# Patient Record
Sex: MALE | Race: ASIAN | NOT HISPANIC OR LATINO | Employment: FULL TIME | URBAN - METROPOLITAN AREA
[De-identification: names, ages, dates, MRNs, and addresses within clinical notes are randomized per-mention and may not be internally consistent; named-entity substitution may affect disease eponyms.]

---

## 2021-06-10 ENCOUNTER — TELEPHONE (OUTPATIENT)
Dept: INTERNAL MEDICINE | Facility: CLINIC | Age: 41
End: 2021-06-10

## 2021-06-10 NOTE — TELEPHONE ENCOUNTER
Caller: Chucho Jorgensen    Relationship: Self    Best call back number:[phone]     Who are you requesting to speak with (clinical staff, provider,  specific staff member): NURSE    What was the call regarding: PATIENT STATES THAT HE IS A CURRENT PATIENT OF DR. WESTFALL AND HIS MOTHER IN LAW RECENTLY TESTED POSITIVE FOR HEP A. PATIENT IS CONCERNED AND WANTS TO KNOW THE STEPS THAT HE NEEDS TO TAKE. SHE WAS LIVING WITH THEM RECENTLY AND HE FEELS THE FAMILY IS AT RISK FOR EXPOSURE.    Do you require a callback: YES

## 2021-06-11 RX ORDER — IMMUNE GLOBULIN (HUMAN) 0.17 G/ML
INJECTION, SOLUTION INTRAMUSCULAR ONCE
Status: CANCELLED | OUTPATIENT
Start: 2021-06-11 | End: 2021-06-11

## 2021-06-11 NOTE — TELEPHONE ENCOUNTER
If he has had the hepatitis A shot he should not have to do anything other than your typical hygiene.  If is not had a hepatitis A shot I would probably have him come in.  Also if somebody has a close exposure sometimes we try to give them immunoglobulin but that has been difficult to get as of late.  Probably would set up an appointment

## 2021-06-11 NOTE — TELEPHONE ENCOUNTER
I S/W THE PATIENT AND RELAYED DR. WESTFALL'S MESSAGE.  THE PATIENT IS GOING TO CALL BACK AND SCHEDULE AN APPT TO GET THE HEP A VACCINE.    THANK YOU

## 2021-06-11 NOTE — TELEPHONE ENCOUNTER
I S/W THE PATIENT AND HE SAID THAT SHE ACTUALLY TESTED POSITIVE FOR HEP A OVER A MONTH AGO AND JUST FOUND OUT THE RESULTS YESTERDAY.  SHE LIVED WITH KUMAR AND HIS FAMILY FOR ABOUT A MONTH AND A HALF WHILE SHE WAS HEALING FROM A BROKEN BONE (MID-April THROUGH MAY).  SHE NEVER HAD ANY SYMPTOMS AND NO ONE IN HIS FAMILY HAS ANY SYMPTOMS.  HE WAS WONDERING IF THAT CHANGES ANYTHING AND IF HE STILL NEEDS TO COME IN?    THANK YOU

## 2022-12-20 ENCOUNTER — OFFICE VISIT (OUTPATIENT)
Dept: INTERNAL MEDICINE | Facility: CLINIC | Age: 42
End: 2022-12-20

## 2022-12-20 VITALS
TEMPERATURE: 97.3 F | HEIGHT: 62 IN | BODY MASS INDEX: 32.2 KG/M2 | RESPIRATION RATE: 16 BRPM | SYSTOLIC BLOOD PRESSURE: 134 MMHG | HEART RATE: 95 BPM | DIASTOLIC BLOOD PRESSURE: 92 MMHG | WEIGHT: 175 LBS | OXYGEN SATURATION: 100 %

## 2022-12-20 DIAGNOSIS — Z00.00 ROUTINE GENERAL MEDICAL EXAMINATION AT A HEALTH CARE FACILITY: ICD-10-CM

## 2022-12-20 DIAGNOSIS — Z12.5 SPECIAL SCREENING FOR MALIGNANT NEOPLASM OF PROSTATE: ICD-10-CM

## 2022-12-20 DIAGNOSIS — R42 VERTIGO: ICD-10-CM

## 2022-12-20 DIAGNOSIS — R05.8 OTHER COUGH: Primary | ICD-10-CM

## 2022-12-20 PROCEDURE — 99396 PREV VISIT EST AGE 40-64: CPT | Performed by: INTERNAL MEDICINE

## 2022-12-20 NOTE — PROGRESS NOTES
"Chief Complaint   Patient presents with   • Sore Throat   • Cough   • Headache     No fever        Subjective   Chucho Jorgensen is a 42 y.o. male here for   Chief Complaint   Patient presents with   • Sore Throat   • Cough   • Headache     No fever    .    History of Present Illness     The following portions of the patient's history were reviewed and updated as appropriate: allergies, current medications, past family history, past medical history, past social history, past surgical history, and problem list.  Here for the physical today.  Overall doing very well.  No specific concerns.  We talked about diet and exercise habits.  Discussed prevention recommendations.   Review of Systems   Constitutional: Positive for fever.   HENT: Positive for congestion and sore throat. Negative for drooling, facial swelling, hearing loss, mouth sores, rhinorrhea, sinus pressure, swollen glands, tinnitus and trouble swallowing.    Eyes: Negative.    Respiratory: Negative.    Cardiovascular: Negative.    Gastrointestinal: Negative.    Endocrine: Negative.    Genitourinary: Negative for difficulty urinating, dysuria, flank pain, frequency, hematuria and urgency.   Musculoskeletal: Negative for arthralgias and myalgias.   Skin: Negative for color change, dry skin, rash and wound.   Neurological: Negative for dizziness, tremors, weakness, headache, memory problem and confusion.        Vertigo for about 4 weeks getting better   Hematological: Negative for adenopathy. Does not bruise/bleed easily.   Psychiatric/Behavioral: Negative for agitation, behavioral problems, depressed mood and stress. The patient is not nervous/anxious.        Body mass index is 32.01 kg/m².   Vitals:    12/20/22 1026   BP: 134/92   BP Location: Left arm   Patient Position: Sitting   Cuff Size: Large Adult   Pulse: 95   Resp: 16   Temp: 97.3 °F (36.3 °C)   TempSrc: Temporal   SpO2: 100%   Weight: 79.4 kg (175 lb)   Height: 157.5 cm (62\")        Objective "   Physical Exam  Vitals and nursing note reviewed.   Constitutional:       Appearance: Normal appearance.   HENT:      Head: Normocephalic and atraumatic.   Eyes:      Extraocular Movements: Extraocular movements intact.      Pupils: Pupils are equal, round, and reactive to light.   Cardiovascular:      Rate and Rhythm: Normal rate and regular rhythm.   Pulmonary:      Effort: Pulmonary effort is normal.      Breath sounds: Normal breath sounds.   Abdominal:      General: Abdomen is flat.      Palpations: Abdomen is soft.   Musculoskeletal:         General: No swelling or deformity.      Cervical back: Neck supple.      Right lower leg: No edema.      Left lower leg: No edema.   Skin:     General: Skin is warm.      Capillary Refill: Capillary refill takes less than 2 seconds.      Findings: No rash.   Neurological:      General: No focal deficit present.      Mental Status: He is alert and oriented to person, place, and time.      Cranial Nerves: Cranial nerves 2-12 are intact.      Sensory: Sensation is intact.      Motor: Motor function is intact.      Coordination: Coordination is intact.      Gait: Gait is intact.         No results found for: CBCDIF, CMP, LIPIDINTERP, HGBA1C, TSH, LLGC87FV, PSA, TESTOSTERONE     Health Maintenance   Topic Date Due   • TDAP/TD VACCINES (1 - Tdap) Never done   • INFLUENZA VACCINE  08/01/2022        Assessment & Plan   Problems Addressed this Visit    None  Visit Diagnoses     Vertigo    -  Primary    Relevant Orders    Ear Cerumen Removal    Routine general medical examination at a health care facility        Relevant Orders    CBC & Differential    Comprehensive Metabolic Panel    Hemoglobin A1c    TSH    Testosterone    Lipid Panel With / Chol / HDL Ratio    PSA Screen    Special screening for malignant neoplasm of prostate        Relevant Orders    CBC & Differential    Comprehensive Metabolic Panel    Hemoglobin A1c    TSH    Testosterone    Lipid Panel With / Chol / HDL  Ratio    PSA Screen      Diagnoses       Codes Comments    Vertigo    -  Primary ICD-10-CM: R42  ICD-9-CM: 780.4     Routine general medical examination at a health care facility     ICD-10-CM: Z00.00  ICD-9-CM: V70.0     Special screening for malignant neoplasm of prostate     ICD-10-CM: Z12.5  ICD-9-CM: V76.44           Orders Placed This Encounter   Procedures   • Ear Cerumen Removal      Elevated blood pressure mild.  I think working on weight reduction and exercise to start with and follow-up in 2 months if it still elevated we can add an ARB  Vertigo with no neurologic abnormalities.  No hearing loss or tinnitus probably viral labyrinthitis.  It is improving but still some subtle symptoms.  Epley maneuver discussed and if it continues we will do an MRI but he wanted to wait on that.  I think this will continue to resolve  Upper respiratory illness with negative flu testing.  COVID test pending  .  Quarantine until COVID test negative    Depression screen reviewed with patient and negative.  Advised behavioral modifications including dietary changes and increased exercise with goal of healthy weight and lifestyle.   Will make follow up plans based on lab results as above.          Return in about 2 months (around 2/20/2023).

## 2022-12-21 ENCOUNTER — TELEPHONE (OUTPATIENT)
Dept: INTERNAL MEDICINE | Facility: CLINIC | Age: 42
End: 2022-12-21

## 2022-12-21 LAB
ALBUMIN SERPL-MCNC: 5 G/DL (ref 3.5–5.2)
ALBUMIN/GLOB SERPL: 2.5 G/DL
ALP SERPL-CCNC: 75 U/L (ref 39–117)
ALT SERPL-CCNC: 32 U/L (ref 1–41)
AST SERPL-CCNC: 22 U/L (ref 1–40)
BASOPHILS # BLD MANUAL: 0.21 10*3/MM3 (ref 0–0.2)
BASOPHILS NFR BLD MANUAL: 2.7 % (ref 0–1.5)
BILIRUB SERPL-MCNC: 0.4 MG/DL (ref 0–1.2)
BUN SERPL-MCNC: 10 MG/DL (ref 6–20)
BUN/CREAT SERPL: 12 (ref 7–25)
CALCIUM SERPL-MCNC: 9.7 MG/DL (ref 8.6–10.5)
CHLORIDE SERPL-SCNC: 104 MMOL/L (ref 98–107)
CHOLEST SERPL-MCNC: 259 MG/DL (ref 0–200)
CHOLEST/HDLC SERPL: 6.48 {RATIO}
CO2 SERPL-SCNC: 26.6 MMOL/L (ref 22–29)
CREAT SERPL-MCNC: 0.83 MG/DL (ref 0.76–1.27)
DIFFERENTIAL COMMENT: ABNORMAL
EGFRCR SERPLBLD CKD-EPI 2021: 110.7 ML/MIN/1.73
ERYTHROCYTE [DISTWIDTH] IN BLOOD BY AUTOMATED COUNT: 16.6 % (ref 12.3–15.4)
GLOBULIN SER CALC-MCNC: 2 GM/DL
GLUCOSE SERPL-MCNC: 87 MG/DL (ref 65–99)
HBA1C MFR BLD: 5.8 % (ref 4.8–5.6)
HCT VFR BLD AUTO: 44.9 % (ref 37.5–51)
HDLC SERPL-MCNC: 40 MG/DL (ref 40–60)
HGB BLD-MCNC: 14.1 G/DL (ref 13–17.7)
LABCORP SARS-COV-2, NAA 2 DAY TAT: NORMAL
LDLC SERPL CALC-MCNC: 164 MG/DL (ref 0–100)
LYMPHOCYTES # BLD MANUAL: 2.75 10*3/MM3 (ref 0.7–3.1)
LYMPHOCYTES NFR BLD MANUAL: 36 % (ref 19.6–45.3)
MCH RBC QN AUTO: 21.6 PG (ref 26.6–33)
MCHC RBC AUTO-ENTMCNC: 31.4 G/DL (ref 31.5–35.7)
MCV RBC AUTO: 68.9 FL (ref 79–97)
MONOCYTES # BLD MANUAL: 0.51 10*3/MM3 (ref 0.1–0.9)
MONOCYTES NFR BLD MANUAL: 6.7 % (ref 5–12)
NEUTROPHILS # BLD MANUAL: 4.18 10*3/MM3 (ref 1.7–7)
NEUTROPHILS NFR BLD MANUAL: 54.7 % (ref 42.7–76)
NRBC BLD AUTO-RTO: 0 /100 WBC (ref 0–0.2)
PLATELET # BLD AUTO: 236 10*3/MM3 (ref 140–450)
PLATELET BLD QL SMEAR: ABNORMAL
POTASSIUM SERPL-SCNC: 4.2 MMOL/L (ref 3.5–5.2)
PROT SERPL-MCNC: 7 G/DL (ref 6–8.5)
PSA SERPL-MCNC: 1.43 NG/ML (ref 0–4)
RBC # BLD AUTO: 6.52 10*6/MM3 (ref 4.14–5.8)
RBC MORPH BLD: ABNORMAL
SARS-COV-2 RNA RESP QL NAA+PROBE: DETECTED
SODIUM SERPL-SCNC: 140 MMOL/L (ref 136–145)
TESTOST SERPL-MCNC: 280 NG/DL (ref 264–916)
TRIGL SERPL-MCNC: 294 MG/DL (ref 0–150)
TSH SERPL DL<=0.005 MIU/L-ACNC: 1.13 UIU/ML (ref 0.27–4.2)
VLDLC SERPL CALC-MCNC: 55 MG/DL (ref 5–40)
WBC # BLD AUTO: 7.64 10*3/MM3 (ref 3.4–10.8)

## 2022-12-21 RX ORDER — AMOXICILLIN 875 MG/1
875 TABLET, COATED ORAL 2 TIMES DAILY
Qty: 20 TABLET | Refills: 0 | Status: SHIPPED | OUTPATIENT
Start: 2022-12-21 | End: 2022-12-31

## 2022-12-21 NOTE — TELEPHONE ENCOUNTER
Caller: Chucho Jorgensen    Relationship: Self    Best call back number: [phone]    What medication are you requesting: ANY    What are your current symptoms: FEVER, INTENSE SINUS PRESSURE, EAR PAIN, COUGH, SORE THROAT     How long have you been experiencing symptoms: COUGH, SORE THROAT SINCE Monday    FEVER, INTENSE SINUS PRESSURE, EAR PAIN SINCE YESTERDAY     Have you had these symptoms before:    [] Yes  [x] No    Have you been treated for these symptoms before:   [] Yes  [x] No    If a prescription is needed, what is your preferred pharmacy and phone number:      [facility] [id number] - [place], [place] - [address] AT [facility] [address] [facility] [phone] [facility] [phone]       Additional notes: PATIENT WAS IN TO SEE DR WESTFALL YESTERDAY AND IS FEELING WORSE TODAY. PLEASE ADVISE.

## 2022-12-22 LAB
HCV AB S/CO SERPL IA: <0.1 S/CO RATIO (ref 0–0.9)
Lab: NORMAL
WRITTEN AUTHORIZATION: NORMAL

## 2022-12-22 RX ORDER — ROSUVASTATIN CALCIUM 10 MG/1
10 TABLET, COATED ORAL DAILY
Qty: 90 TABLET | Refills: 0 | Status: SHIPPED | OUTPATIENT
Start: 2022-12-22 | End: 2023-02-22

## 2023-02-22 RX ORDER — ROSUVASTATIN CALCIUM 10 MG/1
TABLET, COATED ORAL
Qty: 30 TABLET | Refills: 1 | Status: SHIPPED | OUTPATIENT
Start: 2023-02-22 | End: 2023-03-13 | Stop reason: SDUPTHER

## 2023-03-13 RX ORDER — ROSUVASTATIN CALCIUM 10 MG/1
10 TABLET, COATED ORAL DAILY
Qty: 30 TABLET | Refills: 1 | Status: SHIPPED | OUTPATIENT
Start: 2023-03-13

## 2023-08-11 RX ORDER — ROSUVASTATIN CALCIUM 10 MG/1
10 TABLET, COATED ORAL DAILY
Qty: 90 TABLET | Refills: 0 | Status: SHIPPED | OUTPATIENT
Start: 2023-08-11

## 2023-09-24 RX ORDER — ROSUVASTATIN CALCIUM 10 MG/1
10 TABLET, COATED ORAL DAILY
Qty: 90 TABLET | Refills: 4 | Status: SHIPPED | OUTPATIENT
Start: 2023-09-24

## 2023-09-26 ENCOUNTER — TELEPHONE (OUTPATIENT)
Dept: INTERNAL MEDICINE | Facility: CLINIC | Age: 43
End: 2023-09-26

## 2023-09-26 NOTE — TELEPHONE ENCOUNTER
Caller: Chucho Jorgensen    Relationship: Self    Best call back number: [phone]    What is the medical concern/diagnosis: HEMORRHOID BECOMES INFLAMED AND LEAKS    What specialty or service is being requested: GASTRO OR PROCTOLOGY    What is the provider, practice or medical service name: WHOEVER DR. WESTFALL RECOMMENDS

## 2023-10-20 ENCOUNTER — OFFICE VISIT (OUTPATIENT)
Dept: INTERNAL MEDICINE | Facility: CLINIC | Age: 43
End: 2023-10-20
Payer: COMMERCIAL

## 2023-10-20 VITALS
TEMPERATURE: 96.9 F | OXYGEN SATURATION: 97 % | SYSTOLIC BLOOD PRESSURE: 110 MMHG | BODY MASS INDEX: 32.39 KG/M2 | HEIGHT: 62 IN | DIASTOLIC BLOOD PRESSURE: 78 MMHG | RESPIRATION RATE: 16 BRPM | HEART RATE: 86 BPM | WEIGHT: 176 LBS

## 2023-10-20 DIAGNOSIS — K64.9 HEMORRHOIDS, UNSPECIFIED HEMORRHOID TYPE: ICD-10-CM

## 2023-10-20 DIAGNOSIS — E78.49 OTHER HYPERLIPIDEMIA: Primary | ICD-10-CM

## 2023-10-20 PROCEDURE — 99214 OFFICE O/P EST MOD 30 MIN: CPT | Performed by: INTERNAL MEDICINE

## 2023-10-20 NOTE — PROGRESS NOTES
"Chief Complaint  Follow-up and Hyperlipidemia    Subjective        Chucho Jorgensen presents to [facility]  Hyperlipidemia      Denies any significant headache, shortness of breath or chest pain.  No visual changes.  Taking medication without complication.   No body aches from the rosuvastatin.  He also complained about this hemorrhoid that did not improve apparently after the proctozone  Objective   Vital Signs:  /78 (BP Location: Left arm, Patient Position: Sitting, Cuff Size: Large Adult)   Pulse 86   Temp 96.9 °F (36.1 °C) (Temporal)   Resp 16   Ht 157.5 cm (62\")   Wt 79.8 kg (176 lb)   SpO2 97%   BMI 32.19 kg/m²   Estimated body mass index is 32.19 kg/m² as calculated from the following:    Height as of this encounter: 157.5 cm (62\").    Weight as of this encounter: 79.8 kg (176 lb).             Physical Exam  Vitals and nursing note reviewed.   Constitutional:       Appearance: Normal appearance.   HENT:      Head: Normocephalic and atraumatic.   Cardiovascular:      Rate and Rhythm: Normal rate and regular rhythm.   Pulmonary:      Effort: Pulmonary effort is normal.      Breath sounds: Normal breath sounds.   Abdominal:      General: Abdomen is flat.      Palpations: Abdomen is soft.   Musculoskeletal:         General: No swelling or deformity.      Cervical back: Neck supple.      Right lower leg: No edema.      Left lower leg: No edema.   Skin:     General: Skin is warm.      Capillary Refill: Capillary refill takes less than 2 seconds.      Findings: No rash.   Neurological:      General: No focal deficit present.      Mental Status: He is alert and oriented to person, place, and time.        Result Review :        Previous labs reviewed           Assessment and Plan   Diagnoses and all orders for this visit:    1. Other hyperlipidemia (Primary)  -     Comprehensive Metabolic Panel  -     Hemoglobin A1c  -     Lipid Panel With / Chol / HDL " Ratio    2. Hemorrhoids, unspecified hemorrhoid type    Hyperlipidemia on rosuvastatin.  Work on diet and exercise.  Recheck lab work on the rosuvastatin and follow-up pending results  Hemorrhoids.  Apparently no improvement on the topical.  We talked about Vasculera.  He is seeing the surgeon next week so he was is going to wait and talk with them about that       Follow Up   No follow-ups on file.  Patient was given instructions and counseling regarding his condition or for health maintenance advice. Please see specific information pulled into the AVS if appropriate.

## 2023-10-21 LAB
ALBUMIN SERPL-MCNC: 5.3 G/DL (ref 3.5–5.2)
ALBUMIN/GLOB SERPL: 2.1 G/DL
ALP SERPL-CCNC: 81 U/L (ref 39–117)
ALT SERPL-CCNC: 37 U/L (ref 1–41)
AST SERPL-CCNC: 27 U/L (ref 1–40)
BILIRUB SERPL-MCNC: 0.4 MG/DL (ref 0–1.2)
BUN SERPL-MCNC: 15 MG/DL (ref 6–20)
BUN/CREAT SERPL: 15.2 (ref 7–25)
CALCIUM SERPL-MCNC: 9.7 MG/DL (ref 8.6–10.5)
CHLORIDE SERPL-SCNC: 105 MMOL/L (ref 98–107)
CHOLEST SERPL-MCNC: 187 MG/DL (ref 0–200)
CHOLEST/HDLC SERPL: 3.9 {RATIO}
CO2 SERPL-SCNC: 28.4 MMOL/L (ref 22–29)
CREAT SERPL-MCNC: 0.99 MG/DL (ref 0.76–1.27)
EGFRCR SERPLBLD CKD-EPI 2021: 96.3 ML/MIN/1.73
GLOBULIN SER CALC-MCNC: 2.5 GM/DL
GLUCOSE SERPL-MCNC: 83 MG/DL (ref 65–99)
HBA1C MFR BLD: 5.7 % (ref 4.8–5.6)
HDLC SERPL-MCNC: 48 MG/DL (ref 40–60)
LDLC SERPL CALC-MCNC: 112 MG/DL (ref 0–100)
POTASSIUM SERPL-SCNC: 4.1 MMOL/L (ref 3.5–5.2)
PROT SERPL-MCNC: 7.8 G/DL (ref 6–8.5)
SODIUM SERPL-SCNC: 141 MMOL/L (ref 136–145)
TRIGL SERPL-MCNC: 156 MG/DL (ref 0–150)
VLDLC SERPL CALC-MCNC: 27 MG/DL (ref 5–40)

## 2023-10-24 ENCOUNTER — OFFICE VISIT (OUTPATIENT)
Dept: SURGERY | Facility: CLINIC | Age: 43
End: 2023-10-24
Payer: COMMERCIAL

## 2023-10-24 VITALS
SYSTOLIC BLOOD PRESSURE: 115 MMHG | DIASTOLIC BLOOD PRESSURE: 78 MMHG | HEIGHT: 62 IN | WEIGHT: 177.6 LBS | BODY MASS INDEX: 32.68 KG/M2

## 2023-10-24 DIAGNOSIS — K60.2 ANAL FISSURE: Primary | ICD-10-CM

## 2023-10-24 PROCEDURE — 99202 OFFICE O/P NEW SF 15 MIN: CPT | Performed by: SURGERY

## 2023-10-24 NOTE — LETTER
October 24, 2023     Otoniel Rodriguez MD (Tony)     Patient: Chucho Jorgensen   YOB: 1980   Date of Visit: 10/24/2023     Dear Otoniel Rodriguez MD (Tony):       Thank you for referring Chucho Jorgensen to me for evaluation. Below are the relevant portions of my assessment and plan of care.    If you have questions, please do not hesitate to call me. I look forward to following Chucho along with you.         Sincerely,        Otoniel Cortez Jr., MD        CC:   No Recipients    Otoniel Cortez Jr., MD  10/24/23 1635  Sign when Signing Visit  Subjective  Chucho Jorgensen is a 42 y.o. male who presents to the office in surgical consultation from Otoniel Rodriguez MD (Tony) for perianal discharge.    History of Present Illness     The patient has a 2 or 3-month history of perianal symptoms.  They started without any known reason, specifically no trauma nor large bowel movement nor diarrheal illness.  He had some rectal bleeding that then seemed to stop.  Since that time he has had intermittent discharge that seems to be somewhat of a mucus type material.  He has had 1 or 2 episodes of bleeding, 1 time after riding an amusement ride.  He is having regular bowel movements without any problems.  He is not having any pain.  He has a previous history of a hemorrhoid and started to treat the area with hemorrhoid creams with no improvement.  He has not had a colonoscopy.    Review of Systems   Constitutional:  Negative for fatigue and fever.   Respiratory:  Negative for chest tightness and shortness of breath.    Cardiovascular:  Negative for chest pain and palpitations.   Gastrointestinal:  Positive for anal bleeding. Negative for abdominal pain, blood in stool, constipation, diarrhea, nausea, rectal pain and vomiting.     Past Medical History:   Diagnosis Date   • Allergies    • Rectal bleeding 2022    Issues with hemorrhoid     Past Surgical History:   Procedure Laterality Date   • SHOULDER SURGERY  Right      Family History   Problem Relation Age of Onset   • No Known Problems Mother    • Hyperlipidemia Father    • Hypertension Father      Social History     Socioeconomic History   • Marital status:    Tobacco Use   • Smoking status: Former     Types: Cigarettes   • Smokeless tobacco: Never   Vaping Use   • Vaping Use: Never used   Substance and Sexual Activity   • Alcohol use: Not Currently   • Drug use: Defer   • Sexual activity: Defer       Objective  Physical Exam  Constitutional:       Appearance: Normal appearance. He is well-developed. He is not toxic-appearing.   Eyes:      General: No scleral icterus.  Pulmonary:      Effort: Pulmonary effort is normal. No respiratory distress.   Genitourinary:     Comments: Rectal: Normal sphincter tone with a skin tag along the anterior lateral aspect of the anus with no evidence of active hemorrhoid disease.  There is a small area at the posterior anus suspicious for a small fistula tract but no expressible material.  Rectal exam is palpable induration along the posterior anus has not visible exteriorly.  Skin:     General: Skin is warm and dry.   Neurological:      Mental Status: He is alert and oriented to person, place, and time.   Psychiatric:         Behavior: Behavior normal.         Thought Content: Thought content normal.         Judgment: Judgment normal.         Assessment & Plan    1.  Anal fissure: The patient has perianal symptoms with intermittent bleeding and drainage of mucus type material.  It is suspicious for an anal fissure.  No fissure is visible on physical exam but there is an area of palpable concern along the posterior aspect of the inner third of the anus.  Possible rectal exam under anesthesia was surgery.  He was advised on using sitz bath's.  He will return the office in 1 month to see Dr. Lilly who has colorectal fellowship training.

## 2023-10-24 NOTE — PROGRESS NOTES
Subjective   Chucho Jorgensen is a 42 y.o. male who presents to the office in surgical consultation from Otoniel Rodriguez MD (Tony) for perianal discharge.    History of Present Illness     The patient has a 2 or 3-month history of perianal symptoms.  They started without any known reason, specifically no trauma nor large bowel movement nor diarrheal illness.  He had some rectal bleeding that then seemed to stop.  Since that time he has had intermittent discharge that seems to be somewhat of a mucus type material.  He has had 1 or 2 episodes of bleeding, 1 time after riding an amusement ride.  He is having regular bowel movements without any problems.  He is not having any pain.  He has a previous history of a hemorrhoid and started to treat the area with hemorrhoid creams with no improvement.  He has not had a colonoscopy.    Review of Systems   Constitutional:  Negative for fatigue and fever.   Respiratory:  Negative for chest tightness and shortness of breath.    Cardiovascular:  Negative for chest pain and palpitations.   Gastrointestinal:  Positive for anal bleeding. Negative for abdominal pain, blood in stool, constipation, diarrhea, nausea, rectal pain and vomiting.     Past Medical History:   Diagnosis Date    Allergies     Rectal bleeding 2022    Issues with hemorrhoid     Past Surgical History:   Procedure Laterality Date    SHOULDER SURGERY Right      Family History   Problem Relation Age of Onset    No Known Problems Mother     Hyperlipidemia Father     Hypertension Father      Social History     Socioeconomic History    Marital status:    Tobacco Use    Smoking status: Former     Types: Cigarettes    Smokeless tobacco: Never   Vaping Use    Vaping Use: Never used   Substance and Sexual Activity    Alcohol use: Not Currently    Drug use: Defer    Sexual activity: Defer       Objective   Physical Exam  Constitutional:       Appearance: Normal appearance. He is well-developed. He is not  toxic-appearing.   Eyes:      General: No scleral icterus.  Pulmonary:      Effort: Pulmonary effort is normal. No respiratory distress.   Genitourinary:     Comments: Rectal: Normal sphincter tone with a skin tag along the anterior lateral aspect of the anus with no evidence of active hemorrhoid disease.  There is a small area at the posterior anus suspicious for a small fistula tract but no expressible material.  Rectal exam is palpable induration along the posterior anus has not visible exteriorly.  Skin:     General: Skin is warm and dry.   Neurological:      Mental Status: He is alert and oriented to person, place, and time.   Psychiatric:         Behavior: Behavior normal.         Thought Content: Thought content normal.         Judgment: Judgment normal.         Assessment & Plan     1.  Anal fissure: The patient has perianal symptoms with intermittent bleeding and drainage of mucus type material.  It is suspicious for an anal fissure.  No fissure is visible on physical exam but there is an area of palpable concern along the posterior aspect of the inner third of the anus.  Possible rectal exam under anesthesia was surgery.  He was advised on using sitz bath's.  He will return the office in 1 month to see Dr. Lilly who has colorectal fellowship training.

## 2023-12-04 ENCOUNTER — OFFICE VISIT (OUTPATIENT)
Dept: SURGERY | Facility: CLINIC | Age: 43
End: 2023-12-04
Payer: COMMERCIAL

## 2023-12-04 VITALS
BODY MASS INDEX: 33.01 KG/M2 | WEIGHT: 179.4 LBS | SYSTOLIC BLOOD PRESSURE: 120 MMHG | DIASTOLIC BLOOD PRESSURE: 84 MMHG | HEIGHT: 62 IN

## 2023-12-04 DIAGNOSIS — Z12.11 SCREENING FOR MALIGNANT NEOPLASM OF COLON: ICD-10-CM

## 2023-12-04 DIAGNOSIS — K60.3 ANAL FISTULA: Primary | ICD-10-CM

## 2023-12-04 NOTE — LETTER
December 4, 2023       No Recipients    Patient: Chucho Jorgensen   YOB: 1980   Date of Visit: 12/4/2023     Dear Otoniel (Forrest) Diego Rodriguez MD:       Thank you for referring Chucho Jorgensen to me for evaluation. Below are the relevant portions of my assessment and plan of care.    If you have questions, please do not hesitate to call me. I look forward to following Chucho along with you.         Sincerely,        Shola Lilly MD        CC:   No Recipients    Shola Lilly MD  12/04/23 1650  Sign when Signing Visit  Colorectal & General Surgery  Follow - Up    Patient: Chucho Jorgensen  YOB: 1980  MRN: [id number]      Assessment  Chucho Jorgensen is a 42 y.o. male with perianal drainage and need for screening colonoscopy as he turns 43 in 3 days.  I suspect that he has either a perianal abscess or a perianal fistula given his symptoms of drainage.  In office anoscopy was negative for any obvious pathology, but there was small amounts of purulent exudate in the posterior position.  Would benefit from examination under anesthesia for better visualization.  We will plan to proceed with screening colonoscopy and examination under anesthesia with possible incision and drainage of perianal abscess.  We discussed the risk, benefits, alternatives, including the risk of perforation and recurrence of drainage.    Chief Complaint: Rectal drainage    History of Present Illness   Chucho Jorgensen is a 42 y.o. male who presents to the office at request of my partner Dr. Forrest Cortez to discuss perianal drainage and his need for screening colonoscopy.  He states that he has daily drainage and he describes it as a thin yellow liquid with small amount of purulent exudate.  He denies pain with bowel movements.  He has 1-2 soft bowel movements per day.  Denies hematochezia except for 1 time when he was riding a roller coaster with his son.  Otherwise, no complaints today.    Most recent colonoscopy:  Never    Past Medical History   Past Medical History:   Diagnosis Date   • Allergies    • Fissure, anal 10/01/23   • Rectal bleeding 2022    Issues with hemorrhoid        Past Surgical History   Past Surgical History:   Procedure Laterality Date   • SHOULDER SURGERY Right        Social History  Social History     Socioeconomic History   • Marital status:    Tobacco Use   • Smoking status: Former     Types: Cigarettes   • Smokeless tobacco: Never   Vaping Use   • Vaping Use: Never used   Substance and Sexual Activity   • Alcohol use: Not Currently   • Drug use: Defer   • Sexual activity: Defer       Family History  Family History   Problem Relation Age of Onset   • No Known Problems Mother    • Hyperlipidemia Father    • Hypertension Father        Colorectal cancer family history: None    Review of Systems  Negative except as documented in the HPI.     Allergies  No Known Allergies    Medications    Current Outpatient Medications:   •  rosuvastatin (CRESTOR) 10 MG tablet, TAKE 1 TABLET BY MOUTH DAILY, Disp: 90 tablet, Rfl: 4    Vital Signs  Vitals:    12/04/23 1006   BP: 120/84        Physical Exam  Constitutional: Resting comfortably, no acute distress  Neck: Supple, trachea midline  Respiratory: No increased work of breathing, Symmetric excursion  Cardiovascular: Well pefursed, no jugular venous distention evident   Abdominal:  Soft, non-tender, non-distended  Lymphatics: No cervical or suprascapular adenopathy  Skin: Warm, dry, no rash on visualized skin surfaces  Musculoskeletal: Symmetric strength, no obvious gross abnormalities  Psychiatric: Alert and oriented ×3, normal affect   Perianal: Small skin tag in the left lateral position, normal anoderm  Digital rectal exam: Normal tone, no masses    DIAGNOSTIC ANOSCOPY    Indication: Rectal drainage risks, benefits and alternatives were discussed and the patient agreed to the procedure.      Procedure Note: With a lubricated, gloved index finger, I gently  performed a 360 degree sweep of the rectum checking for anal sphincter tone, tenderness, nodules, and masses. Following gentle dilation with a digital rectal exam, I inserted the lubricated anoscope with the obturator completely inserted. The obturator was removed after fully inserting the anoscope. The anoscope was slowly withdrawn, and the rectal and anal mucosa examined over 360 degrees.     Findings: No obvious abscess or fistula.  Grade 1 internal hemorrhoids.  Small amount of yellow exudate.   Complications: None  Patient tolerated the procedure well.     Laboratory Results  I have personally reviewed laboratory results from October 20, 2023 demonstrate CMP with creatinine 0.99, AST 27, ALT 37, total bilirubin 0.4, albumin 5.3.  Hemoglobin A1c 5.7.    Radiology  None to review    Endoscopy  None to review         Khang Lilly MD  Colorectal & General Surgery  [facility]    [address] [place] [address], [address]  [place], [place], [address]  P: [phone]  F: [phone]

## 2023-12-04 NOTE — PROGRESS NOTES
Colorectal & General Surgery  Follow - Up    Patient: Chucho Jorgensen  YOB: 1980  MRN: [id number]      Assessment  Chucho Jorgensen is a 42 y.o. male with perianal drainage and need for screening colonoscopy as he turns 43 in 3 days.  I suspect that he has either a perianal abscess or a perianal fistula given his symptoms of drainage.  In office anoscopy was negative for any obvious pathology, but there was small amounts of purulent exudate in the posterior position.  Would benefit from examination under anesthesia for better visualization.  We will plan to proceed with screening colonoscopy and examination under anesthesia with possible incision and drainage of perianal abscess.  We discussed the risk, benefits, alternatives, including the risk of perforation and recurrence of drainage.    Chief Complaint: Rectal drainage    History of Present Illness   Chucho Jorgensen is a 42 y.o. male who presents to the office at request of my partner Dr. Forrest Cortez to discuss perianal drainage and his need for screening colonoscopy.  He states that he has daily drainage and he describes it as a thin yellow liquid with small amount of purulent exudate.  He denies pain with bowel movements.  He has 1-2 soft bowel movements per day.  Denies hematochezia except for 1 time when he was riding a roller coaster with his son.  Otherwise, no complaints today.    Most recent colonoscopy: Never    Past Medical History   Past Medical History:   Diagnosis Date    Allergies     Fissure, anal 10/01/23    Rectal bleeding 2022    Issues with hemorrhoid        Past Surgical History   Past Surgical History:   Procedure Laterality Date    SHOULDER SURGERY Right        Social History  Social History     Socioeconomic History    Marital status:    Tobacco Use    Smoking status: Former     Types: Cigarettes    Smokeless tobacco: Never   Vaping Use    Vaping Use: Never used   Substance and Sexual Activity    Alcohol use: Not  Currently    Drug use: Defer    Sexual activity: Defer       Family History  Family History   Problem Relation Age of Onset    No Known Problems Mother     Hyperlipidemia Father     Hypertension Father        Colorectal cancer family history: None    Review of Systems  Negative except as documented in the HPI.     Allergies  No Known Allergies    Medications    Current Outpatient Medications:     rosuvastatin (CRESTOR) 10 MG tablet, TAKE 1 TABLET BY MOUTH DAILY, Disp: 90 tablet, Rfl: 4    Vital Signs  Vitals:    12/04/23 1006   BP: 120/84        Physical Exam  Constitutional: Resting comfortably, no acute distress  Neck: Supple, trachea midline  Respiratory: No increased work of breathing, Symmetric excursion  Cardiovascular: Well pefursed, no jugular venous distention evident   Abdominal:  Soft, non-tender, non-distended  Lymphatics: No cervical or suprascapular adenopathy  Skin: Warm, dry, no rash on visualized skin surfaces  Musculoskeletal: Symmetric strength, no obvious gross abnormalities  Psychiatric: Alert and oriented ×3, normal affect   Perianal: Small skin tag in the left lateral position, normal anoderm  Digital rectal exam: Normal tone, no masses    DIAGNOSTIC ANOSCOPY    Indication: Rectal drainage risks, benefits and alternatives were discussed and the patient agreed to the procedure.      Procedure Note: With a lubricated, gloved index finger, I gently performed a 360 degree sweep of the rectum checking for anal sphincter tone, tenderness, nodules, and masses. Following gentle dilation with a digital rectal exam, I inserted the lubricated anoscope with the obturator completely inserted. The obturator was removed after fully inserting the anoscope. The anoscope was slowly withdrawn, and the rectal and anal mucosa examined over 360 degrees.     Findings: No obvious abscess or fistula.  Grade 1 internal hemorrhoids.  Small amount of yellow exudate.   Complications: None  Patient tolerated the procedure  well.     Laboratory Results  I have personally reviewed laboratory results from October 20, 2023 demonstrate CMP with creatinine 0.99, AST 27, ALT 37, total bilirubin 0.4, albumin 5.3.  Hemoglobin A1c 5.7.    Radiology  None to review    Endoscopy  None to review         Khang Lilly MD  Colorectal & General Surgery  [facility]    [address], [address]  [place], [place], [address]  P: [phone]  F: [phone]

## 2023-12-08 ENCOUNTER — PREP FOR SURGERY (OUTPATIENT)
Dept: OTHER | Facility: HOSPITAL | Age: 43
End: 2023-12-08
Payer: COMMERCIAL

## 2023-12-11 ENCOUNTER — PRE-ADMISSION TESTING (OUTPATIENT)
Dept: PREADMISSION TESTING | Facility: HOSPITAL | Age: 43
End: 2023-12-11
Payer: COMMERCIAL

## 2023-12-11 VITALS
WEIGHT: 180 LBS | RESPIRATION RATE: 16 BRPM | DIASTOLIC BLOOD PRESSURE: 76 MMHG | SYSTOLIC BLOOD PRESSURE: 125 MMHG | BODY MASS INDEX: 31.89 KG/M2 | TEMPERATURE: 98.3 F | OXYGEN SATURATION: 97 % | HEIGHT: 63 IN | HEART RATE: 89 BPM

## 2023-12-11 LAB
ANION GAP SERPL CALCULATED.3IONS-SCNC: 10.2 MMOL/L (ref 5–15)
BUN SERPL-MCNC: 14 MG/DL (ref 6–20)
BUN/CREAT SERPL: 14.4 (ref 7–25)
CALCIUM SPEC-SCNC: 6.6 MG/DL (ref 8.6–10.5)
CHLORIDE SERPL-SCNC: 107 MMOL/L (ref 98–107)
CO2 SERPL-SCNC: 25.8 MMOL/L (ref 22–29)
CREAT SERPL-MCNC: 0.97 MG/DL (ref 0.76–1.27)
DEPRECATED RDW RBC AUTO: 33.8 FL (ref 37–54)
EGFRCR SERPLBLD CKD-EPI 2021: 98.1 ML/MIN/1.73
ERYTHROCYTE [DISTWIDTH] IN BLOOD BY AUTOMATED COUNT: 14.7 % (ref 12.3–15.4)
GLUCOSE SERPL-MCNC: 124 MG/DL (ref 65–99)
HCT VFR BLD AUTO: 43.5 % (ref 37.5–51)
HGB BLD-MCNC: 13.2 G/DL (ref 13–17.7)
MCH RBC QN AUTO: 20.6 PG (ref 26.6–33)
MCHC RBC AUTO-ENTMCNC: 30.3 G/DL (ref 31.5–35.7)
MCV RBC AUTO: 67.9 FL (ref 79–97)
PLATELET # BLD AUTO: 262 10*3/MM3 (ref 140–450)
PMV BLD AUTO: 9.4 FL (ref 6–12)
POTASSIUM SERPL-SCNC: 3.9 MMOL/L (ref 3.5–5.2)
RBC # BLD AUTO: 6.41 10*6/MM3 (ref 4.14–5.8)
SODIUM SERPL-SCNC: 143 MMOL/L (ref 136–145)
WBC NRBC COR # BLD AUTO: 7.49 10*3/MM3 (ref 3.4–10.8)

## 2023-12-11 PROCEDURE — 85027 COMPLETE CBC AUTOMATED: CPT

## 2023-12-11 PROCEDURE — 80048 BASIC METABOLIC PNL TOTAL CA: CPT

## 2023-12-11 PROCEDURE — 36415 COLL VENOUS BLD VENIPUNCTURE: CPT

## 2023-12-11 RX ORDER — CETIRIZINE HYDROCHLORIDE 5 MG/1
10 TABLET ORAL DAILY PRN
COMMUNITY

## 2023-12-11 NOTE — DISCHARGE INSTRUCTIONS
Take the following medications the morning of surgery:    NO MEDS AM OF SURGERY    TIME OF ARRIVAL 6:00      If you are on prescription narcotic pain medication to control your pain you may also take that medication the morning of surgery.    General Instructions:    Follow your surgeons instructions regarding when to stop solid foods and when to stop liquids.   Verify with your surgeon if you are to complete a bowel prep and when to do so.  Patients who avoid smoking, chewing tobacco and alcohol for 4 weeks prior to surgery have a reduced risk of post-operative complications.  Quit smoking as many days before surgery as you can.  Do not smoke, use chewing tobacco or drink alcohol the day of surgery.   If applicable bring your C-PAP/ BI-PAP machine in with you to preop day of surgery.  Bring any papers given to you in the doctor’s office.  Wear clean comfortable clothes.  Do not wear contact lenses, false eyelashes or make-up.  Bring a case for your glasses.   Bring crutches or walker if applicable.  Remove all piercings.  Leave jewelry and any other valuables at home.  Hair extensions with metal clips must be removed prior to surgery.  The Pre-Admission Testing nurse will instruct you to bring medications if unable to obtain an accurate list in Pre-Admission Testing.        If you were given a blood bank ID arm band remember to bring it with you the day of surgery.    Preventing a Surgical Site Infection:  For 2 to 3 days before surgery, avoid shaving with a razor because the razor can irritate skin and make it easier to develop an infection.    Any areas of open skin can increase the risk of a post-operative wound infection by allowing bacteria to enter and travel throughout the body.  Notify your surgeon if you have any skin wounds / rashes even if it is not near the expected surgical site.  The area will need assessed to determine if surgery should be delayed until it is healed.  The night prior to surgery  shower using a fresh bar of anti-bacterial soap (such as Dial) and clean washcloth.  Sleep in a clean bed with clean clothing.  Do not allow pets to sleep with you.  Shower on the morning of surgery using a fresh bar of anti-bacterial soap (such as Dial) and clean washcloth.  Dry with a clean towel and dress in clean clothing.  Ask your surgeon if you will be receiving antibiotics prior to surgery.  Make sure you, your family, and all healthcare providers clean their hands with soap and water or an alcohol based hand  before caring for you or your wound.    Day of surgery:  Your arrival time is approximately two hours before your scheduled surgery time.  Upon arrival, a Pre-op nurse and Anesthesiologist will review your health history, obtain vital signs, and answer questions you may have.  The only belongings needed at this time will be a list of your home medications and if applicable your C-PAP/BI-PAP machine.  A Pre-op nurse will start an IV and you may receive medication in preparation for surgery, including something to help you relax.     Please be aware that surgery does come with discomfort.  We want to make every effort to control your discomfort so please discuss any uncontrolled symptoms with your nurse.   Your doctor will most likely have prescribed pain medications.      If you are going home after surgery you will receive individualized written care instructions before being discharged.  A responsible adult must drive you to and from the hospital on the day of your surgery and stay with you for 24 hours.  Discharge prescriptions can be filled by the hospital pharmacy during regular pharmacy hours.  If you are having surgery late in the day/evening your prescription may be e-prescribed to your pharmacy.  Please verify your pharmacy hours or chose a 24 hour pharmacy to avoid not having access to your prescription because your pharmacy has closed for the day.    If you are staying overnight  following surgery, you will be transported to your hospital room following the recovery period.  [facility] has all private rooms.    If you have any questions please call Pre-Admission Testing at [phone].  Deductibles and co-payments are collected on the day of service. Please be prepared to pay the required co-pay, deductible or deposit on the day of service as defined by your plan.    Call your surgeon immediately if you experience any of the following symptoms:  Sore Throat  Shortness of Breath or difficulty breathing  Cough  Chills  Body soreness or muscle pain  Headache  Fever  New loss of taste or smell  Do not arrive for your surgery ill.  Your procedure will need to be rescheduled to another time.  You will need to call your physician before the day of surgery to avoid any unnecessary exposure to hospital staff as well as other patients.

## 2023-12-15 ENCOUNTER — ANESTHESIA EVENT (OUTPATIENT)
Dept: PERIOP | Facility: HOSPITAL | Age: 43
End: 2023-12-15
Payer: COMMERCIAL

## 2023-12-15 ENCOUNTER — HOSPITAL ENCOUNTER (OUTPATIENT)
Facility: HOSPITAL | Age: 43
Setting detail: HOSPITAL OUTPATIENT SURGERY
Discharge: HOME | End: 2023-12-15
Attending: SURGERY | Admitting: SURGERY
Payer: COMMERCIAL

## 2023-12-15 ENCOUNTER — ANESTHESIA (OUTPATIENT)
Dept: PERIOP | Facility: HOSPITAL | Age: 43
End: 2023-12-15
Payer: COMMERCIAL

## 2023-12-15 VITALS
WEIGHT: 178.57 LBS | RESPIRATION RATE: 16 BRPM | TEMPERATURE: 97.9 F | OXYGEN SATURATION: 99 % | HEIGHT: 62 IN | HEART RATE: 91 BPM | SYSTOLIC BLOOD PRESSURE: 118 MMHG | DIASTOLIC BLOOD PRESSURE: 79 MMHG | BODY MASS INDEX: 32.86 KG/M2

## 2023-12-15 DIAGNOSIS — Z12.11 SCREENING FOR MALIGNANT NEOPLASM OF COLON: ICD-10-CM

## 2023-12-15 DIAGNOSIS — K60.3 ANAL FISTULA: ICD-10-CM

## 2023-12-15 PROCEDURE — 25010000002 MIDAZOLAM PER 1 MG: Performed by: ANESTHESIOLOGY

## 2023-12-15 PROCEDURE — 0 BUPIVACAINE LIPOSOME 1.3 % SUSPENSION 20 ML VIAL: Performed by: SURGERY

## 2023-12-15 PROCEDURE — C9290 INJ, BUPIVACAINE LIPOSOME: HCPCS | Performed by: SURGERY

## 2023-12-15 PROCEDURE — 25010000002 PROPOFOL 10 MG/ML EMULSION: Performed by: NURSE ANESTHETIST, CERTIFIED REGISTERED

## 2023-12-15 PROCEDURE — 25010000002 PROPOFOL 200 MG/20ML EMULSION: Performed by: NURSE ANESTHETIST, CERTIFIED REGISTERED

## 2023-12-15 PROCEDURE — 25810000003 LACTATED RINGERS PER 1000 ML: Performed by: ANESTHESIOLOGY

## 2023-12-15 PROCEDURE — 88305 TISSUE EXAM BY PATHOLOGIST: CPT | Performed by: SURGERY

## 2023-12-15 PROCEDURE — 45380 COLONOSCOPY AND BIOPSY: CPT | Performed by: SURGERY

## 2023-12-15 RX ORDER — HYDROCODONE BITARTRATE AND ACETAMINOPHEN 5; 325 MG/1; MG/1
1 TABLET ORAL ONCE AS NEEDED
Status: DISCONTINUED | OUTPATIENT
Start: 2023-12-15 | End: 2023-12-15 | Stop reason: HOSPADM

## 2023-12-15 RX ORDER — FENTANYL CITRATE 50 UG/ML
25 INJECTION, SOLUTION INTRAMUSCULAR; INTRAVENOUS
Status: DISCONTINUED | OUTPATIENT
Start: 2023-12-15 | End: 2023-12-15 | Stop reason: HOSPADM

## 2023-12-15 RX ORDER — NALOXONE HCL 0.4 MG/ML
0.2 VIAL (ML) INJECTION AS NEEDED
Status: DISCONTINUED | OUTPATIENT
Start: 2023-12-15 | End: 2023-12-15 | Stop reason: HOSPADM

## 2023-12-15 RX ORDER — HYDRALAZINE HYDROCHLORIDE 20 MG/ML
5 INJECTION INTRAMUSCULAR; INTRAVENOUS
Status: DISCONTINUED | OUTPATIENT
Start: 2023-12-15 | End: 2023-12-15 | Stop reason: HOSPADM

## 2023-12-15 RX ORDER — SODIUM CHLORIDE 0.9 % (FLUSH) 0.9 %
3-10 SYRINGE (ML) INJECTION AS NEEDED
Status: DISCONTINUED | OUTPATIENT
Start: 2023-12-15 | End: 2023-12-15 | Stop reason: HOSPADM

## 2023-12-15 RX ORDER — LIDOCAINE HYDROCHLORIDE 20 MG/ML
INJECTION, SOLUTION INFILTRATION; PERINEURAL AS NEEDED
Status: DISCONTINUED | OUTPATIENT
Start: 2023-12-15 | End: 2023-12-15 | Stop reason: SURG

## 2023-12-15 RX ORDER — HYDROMORPHONE HYDROCHLORIDE 1 MG/ML
0.25 INJECTION, SOLUTION INTRAMUSCULAR; INTRAVENOUS; SUBCUTANEOUS
Status: DISCONTINUED | OUTPATIENT
Start: 2023-12-15 | End: 2023-12-15 | Stop reason: HOSPADM

## 2023-12-15 RX ORDER — PROMETHAZINE HYDROCHLORIDE 25 MG/1
25 SUPPOSITORY RECTAL ONCE AS NEEDED
Status: DISCONTINUED | OUTPATIENT
Start: 2023-12-15 | End: 2023-12-15 | Stop reason: HOSPADM

## 2023-12-15 RX ORDER — SODIUM CHLORIDE, SODIUM LACTATE, POTASSIUM CHLORIDE, CALCIUM CHLORIDE 600; 310; 30; 20 MG/100ML; MG/100ML; MG/100ML; MG/100ML
9 INJECTION, SOLUTION INTRAVENOUS CONTINUOUS
Status: DISCONTINUED | OUTPATIENT
Start: 2023-12-15 | End: 2023-12-15 | Stop reason: HOSPADM

## 2023-12-15 RX ORDER — DIPHENHYDRAMINE HYDROCHLORIDE 50 MG/ML
12.5 INJECTION INTRAMUSCULAR; INTRAVENOUS
Status: DISCONTINUED | OUTPATIENT
Start: 2023-12-15 | End: 2023-12-15 | Stop reason: HOSPADM

## 2023-12-15 RX ORDER — ONDANSETRON 2 MG/ML
4 INJECTION INTRAMUSCULAR; INTRAVENOUS ONCE AS NEEDED
Status: DISCONTINUED | OUTPATIENT
Start: 2023-12-15 | End: 2023-12-15 | Stop reason: HOSPADM

## 2023-12-15 RX ORDER — SODIUM CHLORIDE 0.9 % (FLUSH) 0.9 %
3 SYRINGE (ML) INJECTION EVERY 12 HOURS SCHEDULED
Status: DISCONTINUED | OUTPATIENT
Start: 2023-12-15 | End: 2023-12-15 | Stop reason: HOSPADM

## 2023-12-15 RX ORDER — LABETALOL HYDROCHLORIDE 5 MG/ML
5 INJECTION, SOLUTION INTRAVENOUS
Status: DISCONTINUED | OUTPATIENT
Start: 2023-12-15 | End: 2023-12-15 | Stop reason: HOSPADM

## 2023-12-15 RX ORDER — MIDAZOLAM HYDROCHLORIDE 1 MG/ML
1 INJECTION INTRAMUSCULAR; INTRAVENOUS
Status: COMPLETED | OUTPATIENT
Start: 2023-12-15 | End: 2023-12-15

## 2023-12-15 RX ORDER — DROPERIDOL 2.5 MG/ML
0.62 INJECTION, SOLUTION INTRAMUSCULAR; INTRAVENOUS
Status: DISCONTINUED | OUTPATIENT
Start: 2023-12-15 | End: 2023-12-15 | Stop reason: HOSPADM

## 2023-12-15 RX ORDER — PROMETHAZINE HYDROCHLORIDE 25 MG/1
25 TABLET ORAL ONCE AS NEEDED
Status: DISCONTINUED | OUTPATIENT
Start: 2023-12-15 | End: 2023-12-15 | Stop reason: HOSPADM

## 2023-12-15 RX ORDER — EPHEDRINE SULFATE 50 MG/ML
5 INJECTION, SOLUTION INTRAVENOUS ONCE AS NEEDED
Status: DISCONTINUED | OUTPATIENT
Start: 2023-12-15 | End: 2023-12-15 | Stop reason: HOSPADM

## 2023-12-15 RX ORDER — OXYCODONE HYDROCHLORIDE 5 MG/1
5 TABLET ORAL EVERY 4 HOURS PRN
Qty: 10 TABLET | Refills: 0 | Status: SHIPPED | OUTPATIENT
Start: 2023-12-15 | End: 2023-12-29

## 2023-12-15 RX ORDER — IPRATROPIUM BROMIDE AND ALBUTEROL SULFATE 2.5; .5 MG/3ML; MG/3ML
3 SOLUTION RESPIRATORY (INHALATION) ONCE AS NEEDED
Status: DISCONTINUED | OUTPATIENT
Start: 2023-12-15 | End: 2023-12-15 | Stop reason: HOSPADM

## 2023-12-15 RX ORDER — FLUMAZENIL 0.1 MG/ML
0.2 INJECTION INTRAVENOUS AS NEEDED
Status: DISCONTINUED | OUTPATIENT
Start: 2023-12-15 | End: 2023-12-15 | Stop reason: HOSPADM

## 2023-12-15 RX ORDER — HYDROCODONE BITARTRATE AND ACETAMINOPHEN 7.5; 325 MG/1; MG/1
1 TABLET ORAL EVERY 4 HOURS PRN
Status: DISCONTINUED | OUTPATIENT
Start: 2023-12-15 | End: 2023-12-15 | Stop reason: HOSPADM

## 2023-12-15 RX ORDER — PROPOFOL 10 MG/ML
INJECTION, EMULSION INTRAVENOUS AS NEEDED
Status: DISCONTINUED | OUTPATIENT
Start: 2023-12-15 | End: 2023-12-15 | Stop reason: SURG

## 2023-12-15 RX ORDER — MAGNESIUM HYDROXIDE 1200 MG/15ML
LIQUID ORAL AS NEEDED
Status: DISCONTINUED | OUTPATIENT
Start: 2023-12-15 | End: 2023-12-15 | Stop reason: HOSPADM

## 2023-12-15 RX ORDER — SCOLOPAMINE TRANSDERMAL SYSTEM 1 MG/1
1 PATCH, EXTENDED RELEASE TRANSDERMAL ONCE
Status: DISCONTINUED | OUTPATIENT
Start: 2023-12-15 | End: 2023-12-15 | Stop reason: HOSPADM

## 2023-12-15 RX ORDER — DIAZEPAM 5 MG/1
5 TABLET ORAL EVERY 8 HOURS PRN
Qty: 10 TABLET | Refills: 0 | Status: SHIPPED | OUTPATIENT
Start: 2023-12-15 | End: 2023-12-29

## 2023-12-15 RX ORDER — FENTANYL CITRATE 50 UG/ML
50 INJECTION, SOLUTION INTRAMUSCULAR; INTRAVENOUS ONCE AS NEEDED
Status: DISCONTINUED | OUTPATIENT
Start: 2023-12-15 | End: 2023-12-15 | Stop reason: HOSPADM

## 2023-12-15 RX ORDER — LIDOCAINE HYDROCHLORIDE 10 MG/ML
0.5 INJECTION, SOLUTION INFILTRATION; PERINEURAL ONCE AS NEEDED
Status: DISCONTINUED | OUTPATIENT
Start: 2023-12-15 | End: 2023-12-15 | Stop reason: HOSPADM

## 2023-12-15 RX ORDER — FAMOTIDINE 10 MG/ML
20 INJECTION, SOLUTION INTRAVENOUS ONCE
Status: COMPLETED | OUTPATIENT
Start: 2023-12-15 | End: 2023-12-15

## 2023-12-15 RX ADMIN — SCOPALAMINE 1 PATCH: 1 PATCH, EXTENDED RELEASE TRANSDERMAL at 06:51

## 2023-12-15 RX ADMIN — PROPOFOL 70 MG: 10 INJECTION, EMULSION INTRAVENOUS at 07:54

## 2023-12-15 RX ADMIN — LIDOCAINE HYDROCHLORIDE 50 MG: 20 INJECTION, SOLUTION INFILTRATION; PERINEURAL at 07:54

## 2023-12-15 RX ADMIN — SODIUM CHLORIDE, POTASSIUM CHLORIDE, SODIUM LACTATE AND CALCIUM CHLORIDE 9 ML/HR: 600; 310; 30; 20 INJECTION, SOLUTION INTRAVENOUS at 06:46

## 2023-12-15 RX ADMIN — MIDAZOLAM HYDROCHLORIDE 1 MG: 2 INJECTION, SOLUTION INTRAMUSCULAR; INTRAVENOUS at 07:38

## 2023-12-15 RX ADMIN — MIDAZOLAM HYDROCHLORIDE 1 MG: 2 INJECTION, SOLUTION INTRAMUSCULAR; INTRAVENOUS at 06:52

## 2023-12-15 RX ADMIN — FAMOTIDINE 20 MG: 10 INJECTION INTRAVENOUS at 06:52

## 2023-12-15 RX ADMIN — PROPOFOL 160 MCG/KG/MIN: 10 INJECTION, EMULSION INTRAVENOUS at 07:54

## 2023-12-15 NOTE — DISCHARGE INSTRUCTIONS
Dr. Khang Lilly  [address]  [place] [id number]  [phone]    Discharge Instructions for Anorectal Procedure    Go home, rest and take it easy today; however, you should get up and move about several times today to reduce the risk of developing a clot in your legs.      You may experience some dizziness or memory loss from the anesthesia.  This may last for the next 24 hours.  Someone should plan on staying with you for the first 24 hours for your safety.    Do not make any important legal decisions or sign any legal papers for the next 24 hours.      Eat and drink lightly today.  Start off with liquids, jello, soup, crackers or other bland foods at first. Please drink plenty of fluids. You may advance your diet tomorrow as tolerated as long as you do not experience any nausea or vomiting.     You should apply ice packs to the anal area today and begin your sitz baths tomorrow.    The best method of pain relief is a sitz bath (sitting in a tub or warm water) at least 3 times daily for 15 minutes.  This helps to reduce pain and aids with hygiene/drainage.  The drainage may have an unpleasant odor.  This is not unexpected and should be controlled with baths and showers.      If you have packing, remove the packing tomorrow.  You do not have to replace the packing once removed.  It will be critical to keep the area clean so take a sitz bath or a warm shower and allow soapy water to run over the perianal area after each bowel movement.    Bleeding and drainage are to be expected and may persist for as long as 2-4 weeks. Bleeding may occur with your bowel movements as well. Wear a cotton liner such as a Kotex pad or panty liner inside your underwear to protect your clothing.      Pain Medications  Alternate taking tylenol and ibuprofen at the doses below. If you have been instructed to not take either of those medicines due to another medical issue, please do not take them.  Tylenol 650 mg every 6  hours as needed for pain  Ibuprofen 600 mg every 6 hours as needed for pain  You have prescription strength pain medications available for you if you experience severe pain. Do not take the oxycodone and diazepam (Valium) simultaneously. Space them out by at least one hour.   Oxycodone 5 mg tablets. Take 1 to 2 tablets every 4 to 6 hours as needed for severe pain.   Diazepam (Valium) 5 mg tablets. Take 1 tablet every 8 hours as needed for severe anal pain or anal spasms. Do NOT take simultaneously with oxycodone.   You will not be totally pain free, but your pain medicine should make the pain tolerable.  Please take your pain medicine as prescribed and always take your pills with food to prevent nausea.  If you are having severe pain that cannot be controlled by the pain medicine, please contact me.    Remember that warm sitz baths are often very effective in controlling pain as well.    The goal is for your bowel movements to be soft which will help to minimize pain.  The pain medicine used to keep your comfortable may also cause some constipation so I recommend the following:    Metamucil powder 1 tablespoon in 8oz of water twice daily  Miralax daily as needed to produce one daily bowel movement   Contact me if the above does not result in soft bowel movements as you may need to add to the regimen.      No driving for 24 hours and for as long as you are taking your prescription pain medicine.  You may resume your activities gradually after 2 weeks. No heavy lifting (> 10 pounds) for 2 weeks.     You may return to work on the second day after surgery as you are able to tolerate.       Call the office for a follow up appointment if you continue to have drainage or concerning findings.     Remember to contact me for any of the following:    Fever > 101 degrees  Severe pain that cannot be controlled by taking your pain pills  Severe nausea or vomiting that cannot be controlled by taking your nausea pills  Significant  bleeding   Inability to urinate  Any other questions or concerns

## 2023-12-15 NOTE — ANESTHESIA POSTPROCEDURE EVALUATION
"Patient: Chucho Jorgensen    Procedure Summary       Date: 12/15/23 Room / Location: [facility] OR  / [facility] MAIN OR    Anesthesia Start: 0749 Anesthesia Stop: 0841    Procedures:       EXAM UNDER ANESTHESIA (Rectum)      COLONOSCOPY (Rectum) Diagnosis:       Anal fistula      Screening for malignant neoplasm of colon      (Anal fistula [K60.3])      (Screening for malignant neoplasm of colon [Z12.11])    Surgeons: Shola Lilly MD Provider: Diego Shepherd MD    Anesthesia Type: MAC ASA Status: 2            Anesthesia Type: MAC    Vitals  Vitals Value Taken Time   /79 12/15/23 0837   Temp 36.6 °C (97.9 °F) 12/15/23 0837   Pulse 86 12/15/23 0909   Resp 16 12/15/23 0837   SpO2 100 % 12/15/23 0909   Vitals shown include unfiled device data.        Post Anesthesia Care and Evaluation    Patient location during evaluation: PHASE II  Patient participation: complete - patient participated  Level of consciousness: awake  Pain management: adequate    Airway patency: patent  Anesthetic complications: No anesthetic complications    Cardiovascular status: acceptable  Respiratory status: acceptable  Hydration status: acceptable    Comments: /79 (BP Location: Right arm, Patient Position: Lying)   Pulse 91   Temp 36.6 °C (97.9 °F) (Oral)   Resp 16   Ht 157.5 cm (62\")   Wt 81 kg (178 lb 9.2 oz)   SpO2 99%   BMI 32.66 kg/m²       "

## 2023-12-15 NOTE — ANESTHESIA PREPROCEDURE EVALUATION
Anesthesia Evaluation     Patient summary reviewed and Nursing notes reviewed   history of anesthetic complications:  PONV  NPO Solid Status: > 8 hours  NPO Liquid Status: > 2 hours           Airway   Mallampati: II  TM distance: >3 FB  Neck ROM: full  Dental - normal exam     Pulmonary - normal exam    breath sounds clear to auscultation  (+) a smoker Former,  Cardiovascular - negative cardio ROS and normal exam    Rhythm: regular  Rate: normal    (-) angina, orthopnea, PND, RUSSELL      Neuro/Psych- negative ROS  GI/Hepatic/Renal/Endo    (+) obesity    Musculoskeletal (-) negative ROS    Abdominal    Substance History - negative use     OB/GYN negative ob/gyn ROS         Other - negative ROS                     Anesthesia Plan    ASA 2     MAC   total IV anesthesia    Anesthetic plan, risks, benefits, and alternatives have been provided, discussed and informed consent has been obtained with: patient.      CODE STATUS:

## 2023-12-15 NOTE — OP NOTE
Colorectal & General Surgery  Operative Report    Patient: Chucho Jorgensen  YOB: 1980  MRN: [id number]  DATE OF PROCEDURE: 12/15/23     PREOPERATIVE DIAGNOSIS:  Anal drainage  Colon cancer screening    POSTOPERATIVE DIAGNOSIS:  Normal anal canal  Sigmoid colon polyp, 3 mm    PROCEDURE:  Anorectal examination under anesthesia  Colonoscopy to cecum    FINDINGS:  Normal anal canal.  No obvious etiology of anal drainage.  Sigmoid colon polyp, 3 mm, sessile.  Otherwise, normal colon    SURGEON:  Khang Lilly MD    ASSISTANT:  None    ANESTHESIA:  Monitored anesthesia care    EBL:  None    SPECIMEN:  Sigmoid colon polyp    OPERATIVE DESCRIPTION:  The patient was brought to the operating room under the care of the nursing staff.  The patient was placed in the prone position on the operating room table where monitored anesthesia care was begun.  His buttocks were effaced.  Standardized timeout was performed.  He was prepped and draped usual sterile fashion.    Digital rectal exam demonstrated no palpable masses.  External exam with normal anoderm.  Circumferential anal block was performed with half percent Marcaine and Exparel.  Hill-Scott retractors were inserted into the anal canal and a circumferential examination of the anal mucosa demonstrated no significant pathology.  No etiology for his anal drainage.    The patient was then transferred back to the stretcher placed in the lateral decubitus position.  Colonoscope was inserted into the anal canal and advanced to the colon to the cecum.  Appendiceal orifice was identified as was the ileocecal valve.  The scope was then slowly withdrawn carefully examining the entire mucosa of the colon.  There is a 3 mm sigmoid polyp that was sessile.  It was biopsied with cold forceps completely.  Hemostasis observed.  Polyp retrieved.  Retroflexion was performed in the rectum.  Again there was no etiology for anal drainage from endoscopic exam.  The scope was then  withdrawn.    All needle, sponge, and instrument counts were correct at the end of the case.    The patient tolerated the procedure well and was transferred to the postanesthesia care unit in stable condition.    Khang Lilly M.D.  Colorectal & General Surgery  [facility]    [address] [place] [address], [address]  [place], [place], [address]  P: [phone]  F: [phone]

## 2023-12-18 LAB
LAB AP CASE REPORT: NORMAL
LAB AP CLINICAL INFORMATION: NORMAL
PATH REPORT.FINAL DX SPEC: NORMAL
PATH REPORT.GROSS SPEC: NORMAL

## 2023-12-20 ENCOUNTER — TELEPHONE (OUTPATIENT)
Dept: SURGERY | Facility: CLINIC | Age: 43
End: 2023-12-20
Payer: COMMERCIAL

## 2023-12-20 NOTE — TELEPHONE ENCOUNTER
I called Chucho and discussed his pathology results from colonoscopy.  Polypectomy was normal colonic mucosa, benign.  Repeat colonoscopy recommended in 10 years.    Regarding his anal wetness and drainage, he had a normal examination under anesthesia.  I have sent him educational materials about anal pruritus from the [employer] of colorectal surgeons.  If he continues to have difficulty, we discussed the role of an MRI.  He will try these conservative measures and give us a call if he continues to have problems.

## (undated) DEVICE — SUT GUT CHRM 3/0 SH 27IN G122H

## (undated) DEVICE — PATIENT RETURN ELECTRODE, SINGLE-USE, CONTACT QUALITY MONITORING, ADULT, WITH 9FT CORD, FOR PATIENTS WEIGING OVER 33LBS. (15KG): Brand: MEGADYNE

## (undated) DEVICE — GLV SURG PREMIERPRO ORTHO LTX PF SZ7 BRN

## (undated) DEVICE — TRAP FLD MINIVAC MEGADYNE 100ML

## (undated) DEVICE — LOU MINOR PROCEDURE: Brand: MEDLINE INDUSTRIES, INC.

## (undated) DEVICE — PANTY KNIT MATERN L/XL

## (undated) DEVICE — PREP SOL POVIDONE/IODINE BT 4OZ

## (undated) DEVICE — JELLY,LUBE,STERILE,FLIP TOP,TUBE,4-OZ: Brand: MEDLINE